# Patient Record
Sex: MALE | Race: WHITE | NOT HISPANIC OR LATINO
[De-identification: names, ages, dates, MRNs, and addresses within clinical notes are randomized per-mention and may not be internally consistent; named-entity substitution may affect disease eponyms.]

---

## 2019-11-12 ENCOUNTER — RESULT REVIEW (OUTPATIENT)
Age: 64
End: 2019-11-12

## 2019-11-20 ENCOUNTER — OUTPATIENT (OUTPATIENT)
Dept: OUTPATIENT SERVICES | Facility: HOSPITAL | Age: 64
LOS: 1 days | Discharge: HOME | End: 2019-11-20
Payer: COMMERCIAL

## 2019-11-20 DIAGNOSIS — C61 MALIGNANT NEOPLASM OF PROSTATE: ICD-10-CM

## 2019-11-26 ENCOUNTER — TRANSCRIPTION ENCOUNTER (OUTPATIENT)
Age: 64
End: 2019-11-26

## 2019-11-27 ENCOUNTER — RESULT REVIEW (OUTPATIENT)
Age: 64
End: 2019-11-27

## 2019-11-27 PROCEDURE — 88321 CONSLTJ&REPRT SLD PREP ELSWR: CPT

## 2019-11-29 ENCOUNTER — APPOINTMENT (OUTPATIENT)
Dept: HEMATOLOGY ONCOLOGY | Facility: CLINIC | Age: 64
End: 2019-11-29
Payer: COMMERCIAL

## 2019-11-29 VITALS
SYSTOLIC BLOOD PRESSURE: 142 MMHG | WEIGHT: 196.21 LBS | HEART RATE: 72 BPM | TEMPERATURE: 98.8 F | HEIGHT: 71 IN | DIASTOLIC BLOOD PRESSURE: 84 MMHG | BODY MASS INDEX: 27.47 KG/M2 | RESPIRATION RATE: 16 BRPM | OXYGEN SATURATION: 97 %

## 2019-11-29 DIAGNOSIS — Z86.79 PERSONAL HISTORY OF OTHER DISEASES OF THE CIRCULATORY SYSTEM: ICD-10-CM

## 2019-11-29 DIAGNOSIS — Z87.891 PERSONAL HISTORY OF NICOTINE DEPENDENCE: ICD-10-CM

## 2019-11-29 DIAGNOSIS — Z78.9 OTHER SPECIFIED HEALTH STATUS: ICD-10-CM

## 2019-11-29 DIAGNOSIS — Z80.9 FAMILY HISTORY OF MALIGNANT NEOPLASM, UNSPECIFIED: ICD-10-CM

## 2019-11-29 PROCEDURE — 99205 OFFICE O/P NEW HI 60 MIN: CPT

## 2019-11-30 NOTE — CONSULT LETTER
[Dear  ___] : Dear  [unfilled], [Consult Letter:] : I had the pleasure of evaluating your patient, [unfilled]. [Please see my note below.] : Please see my note below. [Consult Closing:] : Thank you very much for allowing me to participate in the care of this patient.  If you have any questions, please do not hesitate to contact me. [Sincerely,] : Sincerely, [FreeTextEntry3] : Kate Menendez MD

## 2019-11-30 NOTE — HISTORY OF PRESENT ILLNESS
[T: ___] : T[unfilled] [Disease: _____________________] : Disease: [unfilled] [M: ___] : M[unfilled] [AJCC Stage: ____] : AJCC Stage: [unfilled] [N: ___] : N[unfilled] [de-identified] : 65 yo M hx of HTN, recently diagnosed Stage IIB Prostate adenocarcinoma (unfavorable, intermediate risk given percentage involvement of two cores greater than 50%), Judit score 3+4, presents for initial consultation.\par \par Patient had a PSA 3 years ago of 2.39, 2 years ago of 2.99, and then in 10/19/2019 had a PSA of 5.01.  He has been asymptomatic.  Due to rising PSA, he saw Dr. Hickey of Urology who performed core biopsy of prostate.  This resulted as prostate adenocarcinoma, [place] 3+4, 5/12 cores positive, each core of percentage involvement from 30% to 80%, absolute judit 4 involvement from 10% to 30%.  He went for prostate MRI on 11/20/2019 which was negative for any abnormal lesions.  He also went for prostate MRI on 11/25/2019 which showed [id number] left, peripheral zone lesion which had overlying capsular irregularity and bulging concerning for mild extracapsular invasion (PIRADS5).  No evidence of seminal vesicle invasion, pelvic LAD, or aggressive osseous lesions.\par \par Today, patient presents for initial consult.  He reports he had some mild bleeding in urine after biopsy, otherwise no other issues.  He has denied recent illness.  No fevers, chills, NS, SOB, weight loss, abd pain, n/v/d/c, f/u/d.  He reports nocturia 1x a night. The patient denies hesitancy, urgency, gross hematuria, burning, frequency. Feels anxious regarding his diagnosis and about making the decision of what to do next. [de-identified] : prostate adenocarcinoma  [de-identified] : PSA 5.01 [FreeTextEntry1] : undetermined at this time

## 2019-11-30 NOTE — PHYSICAL EXAM
[Fully active, able to carry on all pre-disease performance without restriction] : Status 0 - Fully active, able to carry on all pre-disease performance without restriction [Normal] : affect appropriate [FreeTextEntry1] : deferred

## 2019-11-30 NOTE — ASSESSMENT
[FreeTextEntry1] : 65 yo M hx of HTN, recently diagnosed Stage IIB Prostate adenocarcinoma (unfavorable, intermediate risk given percentage involvement of two cores greater than 50%), [place] score 3+4, presents for initial consultation.\par \par Stage IIB Prostate Adenocarcinoma\par - patient is intermediate risk, unfavorable due to multiple cores having greater than 50% involvement\par - MRI showing concern for mild extracapsular invasion, no seminal vesicle invasion or pelvic PAD\par - bone scan negative\par - at this time, would recommend RT + 4-6 months of ADT vs radical prostatectomy. I had a lengthy discussion with the patient and his wife with regards to the potential AEs including, but not limited to, urine incontinence, erectile dysfunction, urine symptoms for both management options, rectum inflammation and secondary cancer for radiation  \par - also discussed genetic counselling given his family cancer history\par - patient will discuss with wife and will see Dr. Sinclair of Rad Onc, Dr. Esteves of Urology\par - will make a decision and let us know

## 2019-11-30 NOTE — REASON FOR VISIT
[Initial Consultation] : an initial consultation [Spouse] : spouse [FreeTextEntry2] : prostate cancer

## 2019-12-02 LAB — SURGICAL PATHOLOGY STUDY: SIGNIFICANT CHANGE UP

## 2019-12-13 ENCOUNTER — APPOINTMENT (OUTPATIENT)
Dept: UROLOGY | Facility: CLINIC | Age: 64
End: 2019-12-13

## 2019-12-20 ENCOUNTER — OUTPATIENT (OUTPATIENT)
Dept: OUTPATIENT SERVICES | Facility: HOSPITAL | Age: 64
LOS: 1 days | Discharge: HOME | End: 2019-12-20

## 2019-12-23 ENCOUNTER — APPOINTMENT (OUTPATIENT)
Dept: RADIATION ONCOLOGY | Facility: CLINIC | Age: 64
End: 2019-12-23
Payer: COMMERCIAL

## 2019-12-23 VITALS
HEART RATE: 60 BPM | TEMPERATURE: 98.6 F | WEIGHT: 190.7 LBS | RESPIRATION RATE: 16 BRPM | OXYGEN SATURATION: 99 % | DIASTOLIC BLOOD PRESSURE: 74 MMHG | BODY MASS INDEX: 26.7 KG/M2 | HEIGHT: 71 IN | SYSTOLIC BLOOD PRESSURE: 138 MMHG

## 2019-12-23 DIAGNOSIS — C61 MALIGNANT NEOPLASM OF PROSTATE: ICD-10-CM

## 2019-12-23 PROCEDURE — 99204 OFFICE O/P NEW MOD 45 MIN: CPT | Mod: 25

## 2019-12-23 NOTE — PHYSICAL EXAM
[Normal] : Normal sphincter tone, no rectal mass, no prostate nodules, prostate not tender and not enlarged

## 2019-12-23 NOTE — REVIEW OF SYSTEMS
[Nocturia] : nocturia [IPSS Score (0-40): ___] : IPSS score: [unfilled] [EPIC-CP Score (0-60): ___] : EPIC-CP score: [unfilled] [Negative] : Endocrine [Hematuria: Grade 0] : Hematuria: Grade 0 [Urinary Incontinence: Grade 0] : Urinary Incontinence: Grade 0  [Urinary Tract Pain: Grade 0] : Urinary Tract Pain: Grade 0 [Urinary Retention: Grade 0] : Urinary Retention: Grade 0 [Urinary Urgency: Grade 0] : Urinary Urgency: Grade 0 [Urinary Frequency: Grade 0] : Urinary Frequency: Grade 0 [Erectile Dysfunction: Grade 0] : Erectile Dysfunction: Grade 0 [Ejaculation Disorder: Grade 0] : Ejaculation Disorder: Grade 0 [Urinary Frequency] : no urinary frequency

## 2019-12-23 NOTE — DISEASE MANAGEMENT
[c] : c [1] : T1 [0-10] : 0 -10 ng/mL [0] : M0 [7(3+4)] : [place] Score 7(3+4) [] : Patient had a Prostate MRI [5] : 5 [IIB] : IIB [BiopsyDate] : 11/7/19 [TotalCores] : 6 [MeasuredProstateVolume] : 24 cc on MRI [TotalPositiveCores] : 6 [MaxCoreInvolvement] : 80% [BoneScanResults] : 11/20/19